# Patient Record
Sex: FEMALE | URBAN - METROPOLITAN AREA
[De-identification: names, ages, dates, MRNs, and addresses within clinical notes are randomized per-mention and may not be internally consistent; named-entity substitution may affect disease eponyms.]

---

## 2023-04-18 ENCOUNTER — TELEPHONE (OUTPATIENT)
Dept: PULMONOLOGY | Age: 68
End: 2023-04-18

## 2023-06-05 ENCOUNTER — TELEPHONE (OUTPATIENT)
Dept: PULMONOLOGY | Age: 68
End: 2023-06-05

## 2023-06-05 ENCOUNTER — OFFICE VISIT (OUTPATIENT)
Dept: PULMONOLOGY | Age: 68
End: 2023-06-05
Payer: COMMERCIAL

## 2023-06-05 VITALS
WEIGHT: 238 LBS | BODY MASS INDEX: 42.17 KG/M2 | RESPIRATION RATE: 18 BRPM | DIASTOLIC BLOOD PRESSURE: 65 MMHG | HEIGHT: 63 IN | SYSTOLIC BLOOD PRESSURE: 140 MMHG | HEART RATE: 71 BPM | OXYGEN SATURATION: 98 %

## 2023-06-05 DIAGNOSIS — R06.81 WITNESSED APNEIC SPELLS: Primary | ICD-10-CM

## 2023-06-05 DIAGNOSIS — R06.83 SNORING: ICD-10-CM

## 2023-06-05 DIAGNOSIS — G47.19 EXCESSIVE DAYTIME SLEEPINESS: ICD-10-CM

## 2023-06-05 DIAGNOSIS — R59.0 LAD (LYMPHADENOPATHY), MEDIASTINAL: ICD-10-CM

## 2023-06-05 DIAGNOSIS — I25.5 ISCHEMIC CARDIOMYOPATHY: ICD-10-CM

## 2023-06-05 DIAGNOSIS — R93.89 ABNORMAL CT OF THE CHEST: ICD-10-CM

## 2023-06-05 DIAGNOSIS — R93.89 ABNORMAL CT OF THE CHEST: Primary | ICD-10-CM

## 2023-06-05 PROBLEM — I25.10 CAD, MULTIPLE VESSEL: Status: ACTIVE | Noted: 2023-06-05

## 2023-06-05 PROBLEM — I25.10 CAD, MULTIPLE VESSEL: Chronic | Status: ACTIVE | Noted: 2023-06-05

## 2023-06-05 PROCEDURE — 1123F ACP DISCUSS/DSCN MKR DOCD: CPT

## 2023-06-05 PROCEDURE — 99204 OFFICE O/P NEW MOD 45 MIN: CPT

## 2023-06-05 RX ORDER — CARVEDILOL 12.5 MG/1
12.5 TABLET ORAL 2 TIMES DAILY WITH MEALS
COMMUNITY
Start: 2022-10-24

## 2023-06-05 RX ORDER — ATORVASTATIN CALCIUM 80 MG/1
80 TABLET, FILM COATED ORAL DAILY
COMMUNITY
Start: 2022-10-24

## 2023-06-05 RX ORDER — CLOPIDOGREL BISULFATE 75 MG/1
75 TABLET ORAL DAILY
COMMUNITY
Start: 2022-10-24

## 2023-06-05 RX ORDER — MECLIZINE HYDROCHLORIDE 25 MG/1
25 TABLET ORAL 3 TIMES DAILY PRN
COMMUNITY
Start: 2023-05-15

## 2023-06-05 RX ORDER — ERGOCALCIFEROL 1.25 MG/1
50000 CAPSULE ORAL WEEKLY
Qty: 4 CAPSULE | Refills: 2 | COMMUNITY
Start: 2023-05-04 | End: 2023-08-02

## 2023-06-05 RX ORDER — SPIRONOLACTONE 25 MG/1
25 TABLET ORAL DAILY
COMMUNITY
Start: 2022-10-25

## 2023-06-05 RX ORDER — NYSTATIN 100000 U/G
CREAM TOPICAL
COMMUNITY
Start: 2023-04-17

## 2023-06-05 RX ORDER — ASPIRIN 81 MG/1
81 TABLET, CHEWABLE ORAL DAILY
COMMUNITY
Start: 2022-09-29

## 2023-06-05 RX ORDER — SACUBITRIL AND VALSARTAN 49; 51 MG/1; MG/1
1 TABLET, FILM COATED ORAL 2 TIMES DAILY
COMMUNITY
Start: 2023-01-30

## 2023-06-05 RX ORDER — GLUCOSAMINE HCL/CHONDROITIN SU 500-400 MG
CAPSULE ORAL
COMMUNITY
Start: 2022-09-29

## 2023-06-05 ASSESSMENT — SLEEP AND FATIGUE QUESTIONNAIRES
HOW LIKELY ARE YOU TO NOD OFF OR FALL ASLEEP WHEN YOU ARE A PASSENGER IN A CAR FOR AN HOUR WITHOUT A BREAK: 2
HOW LIKELY ARE YOU TO NOD OFF OR FALL ASLEEP WHILE SITTING AND READING: 0
HOW LIKELY ARE YOU TO NOD OFF OR FALL ASLEEP WHILE WATCHING TV: 3
ESS TOTAL SCORE: 7
HOW LIKELY ARE YOU TO NOD OFF OR FALL ASLEEP WHILE SITTING QUIETLY AFTER LUNCH WITHOUT ALCOHOL: 1
HOW LIKELY ARE YOU TO NOD OFF OR FALL ASLEEP IN A CAR, WHILE STOPPED FOR A FEW MINUTES IN TRAFFIC: 0
HOW LIKELY ARE YOU TO NOD OFF OR FALL ASLEEP WHILE SITTING AND TALKING TO SOMEONE: 0
HOW LIKELY ARE YOU TO NOD OFF OR FALL ASLEEP WHILE LYING DOWN TO REST IN THE AFTERNOON WHEN CIRCUMSTANCES PERMIT: 1
NECK CIRCUMFERENCE (INCHES): 14
HOW LIKELY ARE YOU TO NOD OFF OR FALL ASLEEP WHILE SITTING INACTIVE IN A PUBLIC PLACE: 0

## 2023-06-05 NOTE — TELEPHONE ENCOUNTER
Spoke with Kayleigh at [address] [facility] [address] and request to have CT report faxed will request to have imaging pushed from radiology once I know when the study was done.

## 2023-06-05 NOTE — TELEPHONE ENCOUNTER
Pt had a CT chest at [facility] approx Sept 2022. Get report and have imaging pushed to PACS for Damon Brooks to review.

## 2023-06-05 NOTE — PATIENT INSTRUCTIONS
alcohol prior to your sleep study and avoid napping the day of your study, as these will affect the accuracy of your test results. If you are ill the day of your test (COVID-19, cold, upper respiratory infection, flu, etc.) please call to reschedule your test as the test will not be accurate, and for other patient safety. If you should have any questions or need to cancel or reschedule your appointment, please call the [facility] location.  (Even if your test has been scheduled at our [place] location)   [phone]      Thank you,   [facility]

## 2023-06-05 NOTE — TELEPHONE ENCOUNTER
CTA chest 9/26/22 report is rec'd and scanned for review. Spoke with Rinku Graces at [facility] radiology and requested to have imaging pushed.

## 2023-06-05 NOTE — PROGRESS NOTES
PULMONARY, CRITICAL CARE AND SLEEP MEDICINE   Outpatient Sleep Consult Note  CC: Snoring, \"I'm tired but can't sleep\"   Referring Provider: Patient is being seen at the request of Elle Campos CNP, for a consultation to evaluate for Obstructive Sleep Apnea. Presenting HPI 6/5/23:  78 yo female with a 30+ year history of severe snoring and observed apneas that her  always mentioned, better sleeping in recliner, associated with daytime sleepiness, + choking or gasping during sleep, + AM headaches. No treatments tried so far & has not been evaluated for sleep apnea in the past.  Gets ~ 6 hrs of sleep per night. Works 2nd shift. Routine is relatively consistent; bedtime 2 am & rise time 8-10 am.  Has significant trouble falling asleep and staying asleep despite feeling very tired and fatigued. Takes 1-2 hours to fall asleep, has 3-5 awakenings per night using the restroom, and can take 15 mins to 2 hours to fall back to sleep. Takes frequent naps during the day. [place] is 7. No car wrecks/near wrecks because of the sleepiness or nodding off while driving. She would like to optimize her heart health and improve her quality of life. Drinks 1 caffinated beverages/week. Former smoker, 1-2 ppd x34 yrs, quit in 2004. + family history of MARCK in her sister. reports that she quit smoking about 19 years ago. Her smoking use included cigarettes. She has a 49.50 pack-year smoking history. She has never used smokeless tobacco.    Past Medical History:   Diagnosis Date    Arthritis     CHF (congestive heart failure) ([facility])     Diabetic acetonemia ([facility])     Heart attack ([facility] [address]     Hypertension     Psoriasis     Rash      Past Surgical History:   Procedure Laterality Date    APPENDECTOMY      CORONARY STENT PLACEMENT       No Known Allergies  Medication list was reviewed and updated as needed in Epic.    family history is not on file.     Review of Systems: Complete Review of system reviewed with patient and

## 2023-06-06 NOTE — TELEPHONE ENCOUNTER
Creatinine ordered please sign. Pt schedule 7/3/23 for ct scan. Cancelled hst follow up appt on 7/3/23. Pt stated will reschedule after ct scan.

## 2023-07-03 ENCOUNTER — HOSPITAL ENCOUNTER (OUTPATIENT)
Age: 68
Discharge: HOME | End: 2023-07-03
Payer: MEDICARE

## 2023-07-03 ENCOUNTER — HOSPITAL ENCOUNTER (OUTPATIENT)
Dept: CT IMAGING | Age: 68
Discharge: HOME | End: 2023-07-03
Payer: MEDICARE

## 2023-07-03 DIAGNOSIS — R93.89 ABNORMAL CT OF THE CHEST: ICD-10-CM

## 2023-07-03 DIAGNOSIS — R59.0 LAD (LYMPHADENOPATHY), MEDIASTINAL: ICD-10-CM

## 2023-07-03 LAB
CREAT SERPL-MCNC: <0.5 MG/DL (ref 0.6–1.2)
GFR SERPLBLD CREATININE-BSD FMLA CKD-EPI: >60 ML/MIN/{1.73_M2}

## 2023-07-03 PROCEDURE — 82565 ASSAY OF CREATININE: CPT

## 2023-07-03 PROCEDURE — 71260 CT THORAX DX C+: CPT

## 2023-07-03 PROCEDURE — 6360000004 HC RX CONTRAST MEDICATION

## 2023-07-03 PROCEDURE — 36415 COLL VENOUS BLD VENIPUNCTURE: CPT

## 2023-07-03 RX ADMIN — IOPAMIDOL 75 ML: 755 INJECTION, SOLUTION INTRAVENOUS at 14:04

## 2023-07-10 ENCOUNTER — OFFICE VISIT (OUTPATIENT)
Dept: PULMONOLOGY | Age: 68
End: 2023-07-10
Payer: MEDICARE

## 2023-07-10 VITALS
HEART RATE: 61 BPM | OXYGEN SATURATION: 97 % | HEIGHT: 63 IN | BODY MASS INDEX: 42.35 KG/M2 | WEIGHT: 239 LBS | SYSTOLIC BLOOD PRESSURE: 108 MMHG | DIASTOLIC BLOOD PRESSURE: 60 MMHG

## 2023-07-10 DIAGNOSIS — G47.19 EXCESSIVE DAYTIME SLEEPINESS: ICD-10-CM

## 2023-07-10 DIAGNOSIS — I25.10 CAD, MULTIPLE VESSEL: Chronic | ICD-10-CM

## 2023-07-10 DIAGNOSIS — I50.42 CHRONIC COMBINED SYSTOLIC AND DIASTOLIC CONGESTIVE HEART FAILURE (HCC): ICD-10-CM

## 2023-07-10 DIAGNOSIS — G47.33 SEVERE OBSTRUCTIVE SLEEP APNEA: Primary | ICD-10-CM

## 2023-07-10 DIAGNOSIS — I25.5 ISCHEMIC CARDIOMYOPATHY: ICD-10-CM

## 2023-07-10 DIAGNOSIS — R91.8 MULTIPLE PULMONARY NODULES: ICD-10-CM

## 2023-07-10 PROCEDURE — 1036F TOBACCO NON-USER: CPT

## 2023-07-10 PROCEDURE — G8400 PT W/DXA NO RESULTS DOC: HCPCS

## 2023-07-10 PROCEDURE — 1090F PRES/ABSN URINE INCON ASSESS: CPT

## 2023-07-10 PROCEDURE — 1123F ACP DISCUSS/DSCN MKR DOCD: CPT

## 2023-07-10 PROCEDURE — G8417 CALC BMI ABV UP PARAM F/U: HCPCS

## 2023-07-10 PROCEDURE — 3017F COLORECTAL CA SCREEN DOC REV: CPT

## 2023-07-10 PROCEDURE — G8427 DOCREV CUR MEDS BY ELIG CLIN: HCPCS

## 2023-07-10 PROCEDURE — 99214 OFFICE O/P EST MOD 30 MIN: CPT

## 2023-07-10 ASSESSMENT — SLEEP AND FATIGUE QUESTIONNAIRES
HOW LIKELY ARE YOU TO NOD OFF OR FALL ASLEEP WHILE WATCHING TV: 3
HOW LIKELY ARE YOU TO NOD OFF OR FALL ASLEEP WHILE SITTING AND READING: 1
HOW LIKELY ARE YOU TO NOD OFF OR FALL ASLEEP WHILE SITTING INACTIVE IN A PUBLIC PLACE: 1
NECK CIRCUMFERENCE (INCHES): 15.75
ESS TOTAL SCORE: 8
HOW LIKELY ARE YOU TO NOD OFF OR FALL ASLEEP WHILE SITTING AND TALKING TO SOMEONE: 0
HOW LIKELY ARE YOU TO NOD OFF OR FALL ASLEEP WHILE LYING DOWN TO REST IN THE AFTERNOON WHEN CIRCUMSTANCES PERMIT: 1
HOW LIKELY ARE YOU TO NOD OFF OR FALL ASLEEP WHILE SITTING QUIETLY AFTER LUNCH WITHOUT ALCOHOL: 1
HOW LIKELY ARE YOU TO NOD OFF OR FALL ASLEEP WHEN YOU ARE A PASSENGER IN A CAR FOR AN HOUR WITHOUT A BREAK: 1
HOW LIKELY ARE YOU TO NOD OFF OR FALL ASLEEP IN A CAR, WHILE STOPPED FOR A FEW MINUTES IN TRAFFIC: 0

## 2023-07-10 NOTE — PROGRESS NOTES
PULMONARY, CRITICAL CARE AND SLEEP MEDICINE   Outpatient Pulmonary & Sleep Progress Note  CC: Snoring, \"I'm tired but can't sleep\"   Referring Provider:  Isak Orourke, VINCENZO    Interval History 7/10/23:  f/u HST & CT   -  Had HST 6/15/23 demonstrating severe MARCK with AHI 65. ESS is: 8. Has been sleeping in recliner in the meantime with benefit in snoring & witnessed apneic events. Is receptive to CPAP treatment, brought up Inspire. -  Had CT Chest 7/3/23 to f/u abnormal CTPA from [facility] ER when pt was diagnosed with acute heart failure, now demonstrating complete resolution of consolidations, improvement of LAD and multiple small bilateral pulmonary nodules of unknown duration. Presenting HPI 6/5/23:  78 yo female with a 30+ year history of severe snoring and observed apneas that her  always mentioned, better sleeping in recliner, associated with daytime sleepiness, + choking or gasping during sleep, + AM headaches. No treatments tried so far & has not been evaluated for sleep apnea in the past.  Gets ~ 6 hrs of sleep per night. Works 2nd shift. Routine is relatively consistent; bedtime 2 am & rise time 8-10 am.  Has significant trouble falling asleep and staying asleep despite feeling very tired and fatigued. Takes 1-2 hours to fall asleep, has 3-5 awakenings per night using the restroom, and can take 15 mins to 2 hours to fall back to sleep. Takes frequent naps during the day. [place] is 7. No car wrecks/near wrecks because of the sleepiness or nodding off while driving. She would like to optimize her heart health and improve her quality of life. Drinks 1 caffinated beverages/week. Former smoker, 1-2 ppd x34 yrs, quit in 2004. + family history of MARCK in her sister. reports that she quit smoking about 19 years ago. Her smoking use included cigarettes. She has a 49.50 pack-year smoking history.  She has never used smokeless tobacco.    PHYSICAL EXAM:  Blood pressure 108/60, pulse 61,

## 2023-10-09 ENCOUNTER — HOSPITAL ENCOUNTER (OUTPATIENT)
Dept: CT IMAGING | Age: 68
Discharge: HOME | End: 2023-10-09
Payer: MEDICARE

## 2023-10-09 DIAGNOSIS — R91.8 MULTIPLE PULMONARY NODULES: ICD-10-CM

## 2023-10-09 PROCEDURE — 71250 CT THORAX DX C-: CPT

## 2023-10-29 ENCOUNTER — HOSPITAL ENCOUNTER (OUTPATIENT)
Dept: SLEEP CENTER | Age: 68
Discharge: HOME | End: 2023-10-31
Payer: MEDICARE

## 2023-10-29 DIAGNOSIS — I50.42 CHRONIC COMBINED SYSTOLIC AND DIASTOLIC CONGESTIVE HEART FAILURE (HCC): ICD-10-CM

## 2023-10-29 DIAGNOSIS — G47.19 EXCESSIVE DAYTIME SLEEPINESS: ICD-10-CM

## 2023-10-29 DIAGNOSIS — G47.33 SEVERE OBSTRUCTIVE SLEEP APNEA: ICD-10-CM

## 2023-10-29 DIAGNOSIS — I25.5 ISCHEMIC CARDIOMYOPATHY: ICD-10-CM

## 2023-10-29 DIAGNOSIS — I25.10 CAD, MULTIPLE VESSEL: Chronic | ICD-10-CM

## 2023-10-29 PROCEDURE — 95811 POLYSOM 6/>YRS CPAP 4/> PARM: CPT

## 2023-10-30 ENCOUNTER — TELEPHONE (OUTPATIENT)
Dept: PULMONOLOGY | Age: 68
End: 2023-10-30

## 2023-10-30 ENCOUNTER — OFFICE VISIT (OUTPATIENT)
Dept: PULMONOLOGY | Age: 68
End: 2023-10-30
Payer: MEDICARE

## 2023-10-30 VITALS
HEART RATE: 83 BPM | BODY MASS INDEX: 42.34 KG/M2 | DIASTOLIC BLOOD PRESSURE: 62 MMHG | OXYGEN SATURATION: 97 % | SYSTOLIC BLOOD PRESSURE: 110 MMHG | WEIGHT: 238.98 LBS | HEIGHT: 63 IN | RESPIRATION RATE: 18 BRPM

## 2023-10-30 DIAGNOSIS — G47.33 SEVERE OBSTRUCTIVE SLEEP APNEA: ICD-10-CM

## 2023-10-30 DIAGNOSIS — G47.33 SEVERE OBSTRUCTIVE SLEEP APNEA: Primary | ICD-10-CM

## 2023-10-30 DIAGNOSIS — R06.09 DYSPNEA ON EXERTION: ICD-10-CM

## 2023-10-30 DIAGNOSIS — R91.8 MULTIPLE PULMONARY NODULES: Primary | ICD-10-CM

## 2023-10-30 PROCEDURE — 1090F PRES/ABSN URINE INCON ASSESS: CPT

## 2023-10-30 PROCEDURE — G8417 CALC BMI ABV UP PARAM F/U: HCPCS

## 2023-10-30 PROCEDURE — 1123F ACP DISCUSS/DSCN MKR DOCD: CPT

## 2023-10-30 PROCEDURE — G8400 PT W/DXA NO RESULTS DOC: HCPCS

## 2023-10-30 PROCEDURE — G8484 FLU IMMUNIZE NO ADMIN: HCPCS

## 2023-10-30 PROCEDURE — 3017F COLORECTAL CA SCREEN DOC REV: CPT

## 2023-10-30 PROCEDURE — G8427 DOCREV CUR MEDS BY ELIG CLIN: HCPCS

## 2023-10-30 PROCEDURE — 1036F TOBACCO NON-USER: CPT

## 2023-10-30 PROCEDURE — 99214 OFFICE O/P EST MOD 30 MIN: CPT

## 2023-10-30 RX ORDER — ASPIRIN 81 MG/1
81 TABLET ORAL DAILY
COMMUNITY
Start: 2023-04-24

## 2023-10-30 RX ORDER — TRIAMCINOLONE ACETONIDE 1 MG/G
OINTMENT TOPICAL
COMMUNITY
Start: 2023-09-29

## 2023-10-30 RX ORDER — ALBUTEROL SULFATE 90 UG/1
2 AEROSOL, METERED RESPIRATORY (INHALATION) EVERY 4 HOURS PRN
Qty: 18 G | Refills: 5 | Status: SHIPPED | OUTPATIENT
Start: 2023-10-30

## 2023-10-30 RX ORDER — FUROSEMIDE 40 MG/1
40 TABLET ORAL DAILY
COMMUNITY
Start: 2023-08-23

## 2023-10-30 ASSESSMENT — SLEEP AND FATIGUE QUESTIONNAIRES
HOW LIKELY ARE YOU TO NOD OFF OR FALL ASLEEP WHILE LYING DOWN TO REST IN THE AFTERNOON WHEN CIRCUMSTANCES PERMIT: 3
HOW LIKELY ARE YOU TO NOD OFF OR FALL ASLEEP WHILE SITTING AND READING: 1
NECK CIRCUMFERENCE (INCHES): 15
HOW LIKELY ARE YOU TO NOD OFF OR FALL ASLEEP WHILE SITTING INACTIVE IN A PUBLIC PLACE: 1
HOW LIKELY ARE YOU TO NOD OFF OR FALL ASLEEP WHEN YOU ARE A PASSENGER IN A CAR FOR AN HOUR WITHOUT A BREAK: 1
HOW LIKELY ARE YOU TO NOD OFF OR FALL ASLEEP WHILE WATCHING TV: 2
HOW LIKELY ARE YOU TO NOD OFF OR FALL ASLEEP WHILE SITTING AND TALKING TO SOMEONE: 0
HOW LIKELY ARE YOU TO NOD OFF OR FALL ASLEEP WHILE SITTING QUIETLY AFTER LUNCH WITHOUT ALCOHOL: 1
ESS TOTAL SCORE: 9
HOW LIKELY ARE YOU TO NOD OFF OR FALL ASLEEP IN A CAR, WHILE STOPPED FOR A FEW MINUTES IN TRAFFIC: 0

## 2023-10-30 NOTE — TELEPHONE ENCOUNTER
Per CMT need to add PFT to plan. Patient called with message left for patient to call back to office.

## 2023-10-30 NOTE — PATIENT INSTRUCTIONS
will feel better keeping a regular sleep cycle and giving your body a dependable pattern, even (especially) if you are retired or not working. Use light to train your biological clock: When you get up in the morning, exposure yourself to bright lights. When preparing for bed, dim the lights and avoid exposure to screens. The blue light from electronic screens tells our brain that it is time to be awake; it inhibits melatonin production which stops our brain from helping us get to sleep. Go to bed only when sleepy; this reduces the time you are awake in bed (which can lead to frustration and negative thoughts about sleep). If you can't fall asleep within 15-30 minutes, get up and do something boring until you feel sleepy again. Sit quietly in the dark or read the warranty on your refrigerator. Don't expose yourself to bright light during this time (especially screens), which would cue your brain that it is time to be awake. Regular exercise is recommended to help you deepen your sleep (and MANY other reasons). Timing can be important--aim to exercise in the morning or early afternoon, ideally not within 4-6 hours of your bedtime if your schedule allows. Only use your bed for sleeping & intimacy. Do not use your bed as an office, workroom or recreation room. Let your mind/body \"know\" that the bed is associated with sleeping. Develop sleep rituals. Give your body clues it is time to slow down and sleep. Dim the lights and turn off all screens! Examples include; yoga, deep breathing, listen to relaxing music, a cup of caffeine-free hot tea, or do a few minutes of reading (in dim light). A hot bath ~90 mins before bed will raise your body temperature, but it is the drop in body temperature that can help you feel sleepy. Stay away from stimulants such as caffeine and nicotine for at least 4-6 hours before bed. Stimulants can interfere with your ability to fall asleep.  Caffeine is found in tea, cola, coffee,

## 2023-10-30 NOTE — TELEPHONE ENCOUNTER
[address] [phone]  -- see If they have CPAP equipment? Insurance is telling them this is their only DME in network. ..

## 2023-10-30 NOTE — PROGRESS NOTES
their only DME in network. .. Sleep hygiene tips discussed & provided   Patient has been advised: no driving while sleepy; weight loss recommended. Pathophysiology & complications of untreated MARCK have been discussed. Systemic benefits of CPAP therapy have been discussed.   Have discussed alternatives to CPAP therapy including HNS   Revisit ROS   Does not qualify for LCS  CT Chest no IV dye in Oct 2024, dx Pulmonary Nodules   Full PFTs no 6MWT   F/U 31-90  (awaiting titration read)

## 2023-10-31 PROCEDURE — 95811 POLYSOM 6/>YRS CPAP 4/> PARM: CPT | Performed by: INTERNAL MEDICINE

## 2023-10-31 NOTE — TELEPHONE ENCOUNTER
Called [address] does carry DME supplies and equipment. Fax number [phone]. Called jet spoke w/ Natali Mares and asked about DME companies. Natali Mares states there are five.      [address] [phone]    [address] [phone]    [address] [phone]

## 2023-11-01 NOTE — TELEPHONE ENCOUNTER
Orders already signed at [address] [place] [address] Dr 2 days ago. Ideally complete before next visit.

## 2023-11-01 NOTE — TELEPHONE ENCOUNTER
Pt called back stated that they would like the PFT done please sign orders and advise when pt should get this done

## 2023-11-16 NOTE — TELEPHONE ENCOUNTER
Pt is wondering if they [facility] on company will mail her her supplies or does she have to pick an out of net work supplier that is close to her home please advise

## 2024-02-15 ENCOUNTER — OFFICE VISIT (OUTPATIENT)
Dept: PULMONOLOGY | Age: 69
End: 2024-02-15
Payer: MEDICARE

## 2024-02-15 VITALS
OXYGEN SATURATION: 97 % | RESPIRATION RATE: 17 BRPM | BODY MASS INDEX: 45.54 KG/M2 | SYSTOLIC BLOOD PRESSURE: 110 MMHG | DIASTOLIC BLOOD PRESSURE: 76 MMHG | WEIGHT: 257 LBS | HEIGHT: 63 IN | HEART RATE: 73 BPM

## 2024-02-15 DIAGNOSIS — G47.33 SEVERE OBSTRUCTIVE SLEEP APNEA: Primary | ICD-10-CM

## 2024-02-15 DIAGNOSIS — R06.09 DYSPNEA ON EXERTION: ICD-10-CM

## 2024-02-15 PROCEDURE — G8400 PT W/DXA NO RESULTS DOC: HCPCS

## 2024-02-15 PROCEDURE — G8484 FLU IMMUNIZE NO ADMIN: HCPCS

## 2024-02-15 PROCEDURE — G8427 DOCREV CUR MEDS BY ELIG CLIN: HCPCS

## 2024-02-15 PROCEDURE — 1036F TOBACCO NON-USER: CPT

## 2024-02-15 PROCEDURE — 1123F ACP DISCUSS/DSCN MKR DOCD: CPT

## 2024-02-15 PROCEDURE — G8417 CALC BMI ABV UP PARAM F/U: HCPCS

## 2024-02-15 PROCEDURE — 1090F PRES/ABSN URINE INCON ASSESS: CPT

## 2024-02-15 PROCEDURE — 99214 OFFICE O/P EST MOD 30 MIN: CPT

## 2024-02-15 PROCEDURE — 3017F COLORECTAL CA SCREEN DOC REV: CPT

## 2024-02-15 RX ORDER — GABAPENTIN 100 MG/1
100 CAPSULE ORAL 2 TIMES DAILY
COMMUNITY

## 2024-02-15 RX ORDER — GLIMEPIRIDE 2 MG/1
2 TABLET ORAL EVERY MORNING
COMMUNITY

## 2024-02-15 RX ORDER — LATANOPROST 50 UG/ML
SOLUTION/ DROPS OPHTHALMIC
COMMUNITY
Start: 2024-01-22

## 2024-02-15 RX ORDER — DICYCLOMINE HYDROCHLORIDE 10 MG/1
10 CAPSULE ORAL 4 TIMES DAILY PRN
COMMUNITY
Start: 2023-11-13

## 2024-02-15 NOTE — PATIENT INSTRUCTIONS
It was great to see you both again and take care Idalmis!  -Nida          Sleep Hygiene... Important practices for better sleep:    Avoid naps. This will ensure you are sleepy at bedtime.  If you have to take a nap, sleep less than 1 hour, before 3 pm.  SCHEDULE: Have a fixed bedtime and awakening time. The human body thrives on routines. Only deviate from these set sleep times about 1-2 hours on the weekends (more than this will start altering your internal clock). You will feel better keeping a regular sleep cycle and giving your body a dependable pattern, even (especially) if you are retired or not working.   Use light to train your biological clock: When you get up in the morning, exposure yourself to bright lights. When preparing for bed, dim the lights and avoid exposure to screens.  The blue light from electronic screens tells our brain that it is time to be awake; it inhibits melatonin production which stops our brain from helping us get to sleep.   Go to bed only when sleepy; this reduces the time you are awake in bed (which can lead to frustration and negative thoughts about sleep). If you can't fall asleep within 15-30 minutes, get up and do something boring until you feel sleepy again. Sit quietly in the dark or read the warranty on your refrigerator. Don't expose yourself to bright light during this time (especially screens), which would cue your brain that it is time to wake up.   Regular exercise is recommended to help you deepen your sleep (and MANY other reasons), but timing is important--aim to exercise in the morning or early afternoon, not within 4-6 hours of your bedtime.   Only use your bed for sleeping & intimacy. Do not use your bed as an office, workroom or recreation room. Let your mind/body \"know\" that the bed is associated with sleeping.   Develop sleep rituals. Give your body clues it is time to slow down and sleep. Dim the lights and turn off all screens! Examples include; yoga,

## 2024-02-15 NOTE — PROGRESS NOTES
66 yo female with a 30+ year history of severe snoring and observed apneas that her  always mentioned, better sleeping in recliner, associated with daytime sleepiness, + choking or gasping during sleep, + AM headaches.  No treatments tried so far & has not been evaluated for sleep apnea in the past.  Gets ~ 6 hrs of sleep per night.  Works 2nd shift. Routine is relatively consistent; bedtime 2 am & rise time 8-10 am.  Has significant trouble falling asleep and staying asleep despite feeling very tired and fatigued.  Takes 1-2 hours to fall asleep, has 3-5 awakenings per night using the restroom, and can take 15 mins to 2 hours to fall back to sleep.  Takes frequent naps during the day.  [place] is 7.  No car wrecks/near wrecks because of the sleepiness or nodding off while driving.  She would like to optimize her heart health and improve her quality of life.  Drinks 1 caffinated beverages/week.  Former smoker, 1-2 ppd x34 yrs, quit in 2004.  + family history of MARCK in her sister.     reports that she quit smoking about 20 years ago. Her smoking use included cigarettes. She started smoking about 53 years ago. She has a 49.5 pack-year smoking history. She has been exposed to tobacco smoke. She has never used smokeless tobacco.    PHYSICAL EXAM:  Blood pressure 110/76, pulse 73, resp. rate 17, height 1.6 m (5' 3\"), weight 116.6 kg (257 lb), SpO2 97 %.'   238# Jun 2023; 238# Oct 2023; 257# Feb 2024;   Constitutional:  No acute distress. Very pleasant.   HENT:  Oropharynx is clear and moist. Mallampati class II  Eyes: Pupils equal, round, and reactive to light. No scleral icterus.   Neck: No tracheal deviation present.  Circumference is 17 inches   Cardiovascular: Normal heart sounds, no murmur appreciated.  + lower extremity edema with chronic venous stasis changes R>L.  Pulmonary/Chest: Clear breath sounds. No wheezes. No rhonchi. No rales. No decreased breath sounds.  No accessory muscle usage.

## 2024-02-16 ENCOUNTER — TELEPHONE (OUTPATIENT)
Dept: PULMONOLOGY | Age: 69
End: 2024-02-16

## 2024-04-25 ENCOUNTER — HOSPITAL ENCOUNTER (OUTPATIENT)
Dept: PULMONOLOGY | Age: 69
Discharge: HOME | End: 2024-04-25
Payer: MEDICARE

## 2024-04-25 VITALS — OXYGEN SATURATION: 96 %

## 2024-04-25 DIAGNOSIS — R06.09 DYSPNEA ON EXERTION: ICD-10-CM

## 2024-04-25 LAB
DLCO %PRED: 80 %
DLCO PRED: NORMAL
DLCO/VA %PRED: NORMAL
DLCO/VA PRED: NORMAL
DLCO/VA: NORMAL
DLCO: NORMAL
EXPIRATORY TIME-POST: NORMAL
EXPIRATORY TIME: NORMAL
FEF 25-75 %CHNG: NORMAL
FEF 25-75 POST %PRED: NORMAL
FEF 25-75% %PRED-PRE: NORMAL
FEF 25-75% PRED: NORMAL
FEF 25-75-POST: NORMAL
FEF 25-75-PRE: NORMAL
FEV1 %PRED-POST: NORMAL
FEV1 %PRED-PRE: 85 %
FEV1 PRED: NORMAL
FEV1-POST: NORMAL
FEV1-PRE: NORMAL
FEV1/FVC %PRED-POST: NORMAL
FEV1/FVC %PRED-PRE: NORMAL
FEV1/FVC PRED: NORMAL
FEV1/FVC-POST: NORMAL
FEV1/FVC-PRE: 73 %
FVC %PRED-POST: NORMAL
FVC %PRED-PRE: NORMAL
FVC PRED: NORMAL
FVC-POST: NORMAL
FVC-PRE: NORMAL
GAW %PRED: NORMAL
GAW PRED: NORMAL
GAW: NORMAL
IC PRE %PRED: NORMAL
IC PRED: NORMAL
IC: NORMAL
MEP: NORMAL
MIP: NORMAL
MVV %PRED-PRE: NORMAL
MVV PRED: NORMAL
MVV-PRE: NORMAL
PEF %PRED-POST: NORMAL
PEF %PRED-PRE: NORMAL
PEF PRED: NORMAL
PEF%CHNG: NORMAL
PEF-POST: NORMAL
PEF-PRE: NORMAL
RAW %PRED: NORMAL
RAW PRED: NORMAL
RAW: NORMAL
RV PRE %PRED: NORMAL
RV PRED: NORMAL
RV: NORMAL
SVC %PRED: NORMAL
SVC PRED: NORMAL
SVC: NORMAL
TLC PRE %PRED: 92 %
TLC PRED: NORMAL
TLC: NORMAL
VA %PRED: NORMAL
VA PRED: NORMAL
VA: NORMAL
VTG %PRED: NORMAL
VTG PRED: NORMAL
VTG: NORMAL

## 2024-04-25 PROCEDURE — 94640 AIRWAY INHALATION TREATMENT: CPT

## 2024-04-25 PROCEDURE — 94729 DIFFUSING CAPACITY: CPT

## 2024-04-25 PROCEDURE — 94726 PLETHYSMOGRAPHY LUNG VOLUMES: CPT

## 2024-04-25 PROCEDURE — 94760 N-INVAS EAR/PLS OXIMETRY 1: CPT

## 2024-04-25 PROCEDURE — 94060 EVALUATION OF WHEEZING: CPT

## 2024-04-25 ASSESSMENT — PULMONARY FUNCTION TESTS
FEV1_PERCENT_PREDICTED_PRE: 85
FEV1/FVC_PRE: 73

## 2024-04-26 PROBLEM — R06.09 DYSPNEA ON EXERTION: Status: ACTIVE | Noted: 2024-04-26

## 2024-04-26 NOTE — PROCEDURES
[address] [address]                           PULMONARY FUNCTION      PATIENT NAME: LEXUS QUINONEZ           : 1955  MED REC NO: [id number]                      ROOM:   ACCOUNT NO: [id number]                       ADMIT DATE: 2024  PROVIDER: Jian Alvarado MD      DATE OF PROCEDURE:      INDICATIONS:  Dyspnea on exertion.    FINDINGS:    1. Spirometry revealed no evidence of obstructive defect.  FEV1 is 1.89 L, which is 85% predicted.  No significant response to bronchodilators.  FEV1/FVC ratio of 73%.  2. Lung volume revealed normal total lung capacity 4.55 L, which is 92% predicted.  3. Diffusion capacity is normal at 18.38, which is 80% predicted.  4. Flow volume loops appear to be normal.    IMPRESSION:    1. No evidence of obstructive defect or restrictive defect.  2. Normal diffusion capacity.  3. Overall, PFTs do not explain the patient's dyspnea.          JIAN ALVARADO MD      D:  2024 16:29:24     T:  2024 13:40:27     /ERIBERTO  Job #:  [id number]     Doc#:  [id number]

## 2024-06-03 ENCOUNTER — TELEPHONE (OUTPATIENT)
Dept: PULMONOLOGY | Age: 69
End: 2024-06-03

## 2024-06-03 NOTE — TELEPHONE ENCOUNTER
Nida Lebron PA-C  5/3/2024  8:20 PM EDT       Please tell patient that her PFT results are within normal limits.  This is good news and supports a diagnosis of asthma rather than COPD.  Was the Trelegy sample helpful from last office visit?

## 2024-06-20 NOTE — TELEPHONE ENCOUNTER
LVM for pt to inform of PFT results and to let us know if Trelegy was helpful. If she needs an rx to her pharmacy of choice

## 2024-07-15 NOTE — TELEPHONE ENCOUNTER
Pt c/b and was given message from Nida Lebron regarding PFT. Pt stated that the Trelegy did not yield significant results and is very expensive. Pt is requesting a different inhaler that is more budget friendly and might work better. Please advise.

## 2024-07-16 NOTE — TELEPHONE ENCOUNTER
If Trelegy were helpful then per LOV notes would have planned for assistance program.  This will be something to tell pulmonologist Dr. Crowley at next office visit and I will defer to him.  Continue with albuterol on as needed basis.

## 2024-10-11 ENCOUNTER — TELEPHONE (OUTPATIENT)
Dept: PULMONOLOGY | Age: 69
End: 2024-10-11

## 2024-10-14 ENCOUNTER — HOSPITAL ENCOUNTER (OUTPATIENT)
Dept: CT IMAGING | Age: 69
Discharge: HOME | End: 2024-10-14
Payer: MEDICARE

## 2024-10-14 DIAGNOSIS — R91.8 MULTIPLE PULMONARY NODULES: ICD-10-CM

## 2024-10-14 PROCEDURE — 71250 CT THORAX DX C-: CPT

## 2024-10-17 ENCOUNTER — OFFICE VISIT (OUTPATIENT)
Dept: PULMONOLOGY | Age: 69
End: 2024-10-17
Payer: MEDICARE

## 2024-10-17 ENCOUNTER — HOSPITAL ENCOUNTER (OUTPATIENT)
Age: 69
Discharge: HOME | End: 2024-10-17
Payer: MEDICARE

## 2024-10-17 VITALS
DIASTOLIC BLOOD PRESSURE: 64 MMHG | OXYGEN SATURATION: 98 % | RESPIRATION RATE: 17 BRPM | HEIGHT: 64 IN | SYSTOLIC BLOOD PRESSURE: 122 MMHG | WEIGHT: 273 LBS | HEART RATE: 79 BPM | BODY MASS INDEX: 46.61 KG/M2

## 2024-10-17 DIAGNOSIS — J45.909 UNCOMPLICATED ASTHMA, UNSPECIFIED ASTHMA SEVERITY, UNSPECIFIED WHETHER PERSISTENT: Primary | ICD-10-CM

## 2024-10-17 DIAGNOSIS — J45.909 UNCOMPLICATED ASTHMA, UNSPECIFIED ASTHMA SEVERITY, UNSPECIFIED WHETHER PERSISTENT: ICD-10-CM

## 2024-10-17 PROCEDURE — 85048 AUTOMATED LEUKOCYTE COUNT: CPT

## 2024-10-17 PROCEDURE — 1123F ACP DISCUSS/DSCN MKR DOCD: CPT | Performed by: INTERNAL MEDICINE

## 2024-10-17 PROCEDURE — 1036F TOBACCO NON-USER: CPT | Performed by: INTERNAL MEDICINE

## 2024-10-17 PROCEDURE — 1090F PRES/ABSN URINE INCON ASSESS: CPT | Performed by: INTERNAL MEDICINE

## 2024-10-17 PROCEDURE — G8484 FLU IMMUNIZE NO ADMIN: HCPCS | Performed by: INTERNAL MEDICINE

## 2024-10-17 PROCEDURE — 36415 COLL VENOUS BLD VENIPUNCTURE: CPT

## 2024-10-17 PROCEDURE — G8427 DOCREV CUR MEDS BY ELIG CLIN: HCPCS | Performed by: INTERNAL MEDICINE

## 2024-10-17 PROCEDURE — 3017F COLORECTAL CA SCREEN DOC REV: CPT | Performed by: INTERNAL MEDICINE

## 2024-10-17 PROCEDURE — 82785 ASSAY OF IGE: CPT

## 2024-10-17 PROCEDURE — G8417 CALC BMI ABV UP PARAM F/U: HCPCS | Performed by: INTERNAL MEDICINE

## 2024-10-17 PROCEDURE — 99214 OFFICE O/P EST MOD 30 MIN: CPT | Performed by: INTERNAL MEDICINE

## 2024-10-17 PROCEDURE — G8400 PT W/DXA NO RESULTS DOC: HCPCS | Performed by: INTERNAL MEDICINE

## 2024-10-17 RX ORDER — TORSEMIDE 20 MG/1
20 TABLET ORAL DAILY
COMMUNITY

## 2024-10-17 RX ORDER — MULTIVITAMIN
1 TABLET ORAL DAILY
COMMUNITY

## 2024-10-17 NOTE — PROGRESS NOTES
P [facility]                                 Pulmonary Consult /Progress Note :                                                                CC follow up Asthma       HPI  Has been having some SOB and CHAO   SOB associated with some wheezing  Cough ,dry cough  No chest pain   Came for follow up lung nodule  Has been on albuterol     Interval History 2/15/24:  31-90  -  Had titration 10/29/23 recommending CPAP 13.  Set up with AirSense 10 12/15/23.  Does not see a significant improvement in sxs with CPAP but does admit she is no longer as tired in the mornings and does not drag through the day anymore.  Still with nocturia and admits sleep is also frequently interrupted by animals.  Of note, history seems quite better than presenting HPI.   -  MARCK treated with benefit with CPAP 13; used 7:50 hrs avg with compliance >4 hour use 29/30 (used 29) days, residual AHI 3.3.  ESS: 4.     -  Using albuterol almost every day, it is typically helpful.  PFTs not done.  No h/o childhood asthma.      Interval History 10/30/23:  CT f/u   -  Here with f/u CT chest showing stability of multiple pulmonary nodules.  She is asking about an inhaler and possible COPD given her smoking history and struggle with wheezing & CHAO (we have not addressed this before.)  Her mother & sister had COPD/emphysema; mother passed of lung cancer age 69.  She does note that when she was hospitalized with fluid OL, she remembers neb treatments being helpful to her breathing.   -  Had titration last night, awaiting results. Required a chin strap and struggled with a dry mouth.      Interval History 7/10/23:  f/u HST & CT   -  Had HST 6/15/23 demonstrating severe MARCK with AHI 65.  ESS is: 8.  Has been sleeping in recliner in the meantime with benefit in snoring & witnessed apneic events.  Is receptive to CPAP treatment, brought up Inspire.   -  Had CT Chest 7/3/23 to f/u

## 2024-10-18 LAB — EOSINOPHIL # BLD: 0.2 K/UL (ref 0–0.6)

## 2024-10-21 LAB — IGE SERPL-ACNC: 19 KU/L

## 2024-10-24 ENCOUNTER — TELEPHONE (OUTPATIENT)
Dept: PULMONOLOGY | Age: 69
End: 2024-10-24

## 2024-10-24 DIAGNOSIS — R06.02 SOB (SHORTNESS OF BREATH): Primary | ICD-10-CM

## 2024-10-24 RX ORDER — BUDESONIDE AND FORMOTEROL FUMARATE DIHYDRATE 160; 4.5 UG/1; UG/1
2 AEROSOL RESPIRATORY (INHALATION) 2 TIMES DAILY
Qty: 1 EACH | Refills: 2 | Status: SHIPPED | OUTPATIENT
Start: 2024-10-24

## 2024-10-24 NOTE — TELEPHONE ENCOUNTER
Patient states when she was here, she thought Dr. Crowley said he was going to call in "[facility]" for her. Pharmacy says they do not have it for her. If he wants her to have it please sent it to  [facility].

## 2025-04-07 ENCOUNTER — OFFICE VISIT (OUTPATIENT)
Age: 70
End: 2025-04-07
Payer: MEDICARE

## 2025-04-07 VITALS
OXYGEN SATURATION: 100 % | SYSTOLIC BLOOD PRESSURE: 112 MMHG | HEART RATE: 80 BPM | HEIGHT: 64 IN | DIASTOLIC BLOOD PRESSURE: 70 MMHG | WEIGHT: 261 LBS | RESPIRATION RATE: 20 BRPM | BODY MASS INDEX: 44.56 KG/M2

## 2025-04-07 DIAGNOSIS — I25.10 CAD, MULTIPLE VESSEL: Chronic | ICD-10-CM

## 2025-04-07 DIAGNOSIS — E66.813 CLASS 3 SEVERE OBESITY DUE TO EXCESS CALORIES WITH SERIOUS COMORBIDITY AND BODY MASS INDEX (BMI) OF 40.0 TO 44.9 IN ADULT: ICD-10-CM

## 2025-04-07 DIAGNOSIS — G47.33 SEVERE OBSTRUCTIVE SLEEP APNEA: Primary | Chronic | ICD-10-CM

## 2025-04-07 PROCEDURE — G8400 PT W/DXA NO RESULTS DOC: HCPCS

## 2025-04-07 PROCEDURE — 1036F TOBACCO NON-USER: CPT

## 2025-04-07 PROCEDURE — 3017F COLORECTAL CA SCREEN DOC REV: CPT

## 2025-04-07 PROCEDURE — 1159F MED LIST DOCD IN RCRD: CPT

## 2025-04-07 PROCEDURE — G8427 DOCREV CUR MEDS BY ELIG CLIN: HCPCS

## 2025-04-07 PROCEDURE — G8417 CALC BMI ABV UP PARAM F/U: HCPCS

## 2025-04-07 PROCEDURE — 1123F ACP DISCUSS/DSCN MKR DOCD: CPT

## 2025-04-07 PROCEDURE — 1090F PRES/ABSN URINE INCON ASSESS: CPT

## 2025-04-07 PROCEDURE — 99213 OFFICE O/P EST LOW 20 MIN: CPT

## 2025-04-07 RX ORDER — SEMAGLUTIDE 0.68 MG/ML
0.25 INJECTION, SOLUTION SUBCUTANEOUS WEEKLY
COMMUNITY
Start: 2025-01-16

## 2025-04-07 RX ORDER — CHOLECALCIFEROL (VITAMIN D3) 1250 MCG
1 CAPSULE ORAL DAILY
COMMUNITY

## 2025-04-07 RX ORDER — RANOLAZINE 500 MG/1
500 TABLET, EXTENDED RELEASE ORAL 2 TIMES DAILY
COMMUNITY
Start: 2025-03-17

## 2025-04-07 RX ORDER — SPIRONOLACTONE 50 MG/1
50 TABLET, FILM COATED ORAL DAILY
COMMUNITY
Start: 2025-01-27

## 2025-04-07 RX ORDER — FERROUS SULFATE 325(65) MG
325 TABLET ORAL
COMMUNITY

## 2025-04-07 RX ORDER — SACUBITRIL AND VALSARTAN 97; 103 MG/1; MG/1
1 TABLET, FILM COATED ORAL 2 TIMES DAILY
COMMUNITY

## 2025-04-07 RX ORDER — CLOPIDOGREL BISULFATE 75 MG/1
75 TABLET ORAL DAILY
COMMUNITY

## 2025-04-07 ASSESSMENT — SLEEP AND FATIGUE QUESTIONNAIRES
ESS TOTAL SCORE: 8
HOW LIKELY ARE YOU TO NOD OFF OR FALL ASLEEP WHILE SITTING QUIETLY AFTER LUNCH WITHOUT ALCOHOL: MODERATE CHANCE OF DOZING
HOW LIKELY ARE YOU TO NOD OFF OR FALL ASLEEP WHEN YOU ARE A PASSENGER IN A CAR FOR AN HOUR WITHOUT A BREAK: SLIGHT CHANCE OF DOZING
HOW LIKELY ARE YOU TO NOD OFF OR FALL ASLEEP WHILE LYING DOWN TO REST IN THE AFTERNOON WHEN CIRCUMSTANCES PERMIT: SLIGHT CHANCE OF DOZING
HOW LIKELY ARE YOU TO NOD OFF OR FALL ASLEEP WHILE SITTING INACTIVE IN A PUBLIC PLACE: WOULD NEVER DOZE
HOW LIKELY ARE YOU TO NOD OFF OR FALL ASLEEP WHILE SITTING AND READING: SLIGHT CHANCE OF DOZING
HOW LIKELY ARE YOU TO NOD OFF OR FALL ASLEEP IN A CAR, WHILE STOPPED FOR A FEW MINUTES IN TRAFFIC: WOULD NEVER DOZE
HOW LIKELY ARE YOU TO NOD OFF OR FALL ASLEEP WHILE WATCHING TV: HIGH CHANCE OF DOZING
HOW LIKELY ARE YOU TO NOD OFF OR FALL ASLEEP WHILE SITTING AND TALKING TO SOMEONE: WOULD NEVER DOZE

## 2025-04-07 NOTE — PATIENT INSTRUCTIONS
sounds.           CPAP Equipment Cleaning and Disinfecting Schedule  Equipment Cleaning Frequency Instructions  Disinfecting Frequency   Non-Disposable Filters  Weekly Mild soapy water, Rinse, Air Dry Not Required   Disposable Filters Change as needed  2-4 weeks Do Not Wash Not Required   Hose/tubing Daily Mild soapy water, Rinse, Air Dry Once a week   Mask / Nasal Pillows Daily Mild soapy water, Rinse, Air Dry Once a week   Headgear Weekly Hand wash, Mild soapy water, Rinse, Dry  Not Required   Humidifier Daily Empty water daily  Mild soapy water, Rinse well, Air Dry  Once a week   CPAP Unit As Needed Dust with damp cloth,  No detergents or sprays Not Required         Disinfect (per schedule) with 1 part white vinegar and 3 parts water- soak mask and water chamber for 30 minutes every 1-2 weeks, more often if sick.  Allow water/vinegar mixture to run through tubing.  Allow all equipment to air dry.   Drying Hints:   Always hang tubing away from direct sunlight, as this will cause the tubing to become yellow, brittle and crack over a period of time. DO NOT attach the wet tubing to your CPAP unit to blow-dry it. The moisture from the tubing can drain back into your machine. Moisture in your unit can cause sudden pressure increases or short circuits  DO's and DON'Ts:  - Don't use alcohol-based products to clean your mask, because it can cause the materials to become hard and brittle.   - Don't put headgear in the washer or dryer  - Don't use any caustic or household cleaning solutions such as bleach on your CPAP   equipment.  - Do follow the recommended cleaning schedule.    - Do change your disposable filter frequently.     Adapted From: http://www.cpapstation.com/cleaning.shtm.  These are general suggestions for all models please follow specific ’s recommendations and specific instructions         CPAP Replacement Part Reference Guide:  Product Replacement Time Why?   Disposable Filters 2 per 1 month

## 2025-04-07 NOTE — PROGRESS NOTES
discussed & provided   Patient has been advised: no driving while sleepy; weight loss recommended.  Pathophysiology & complications of untreated MARCK & systemic benefits of CPAP therapy have been discussed.    Have discussed alternatives to CPAP therapy including HNS   F/U 1 year     The patient (or guardian, if applicable) and other individuals in attendance with the patient were advised that Artificial Intelligence will be utilized during this visit to record, process the conversation to generate a clinical note, and support improvement of the AI technology. The patient (or guardian, if applicable) and other individuals in attendance at the appointment consented to the use of AI, including the recording.